# Patient Record
(demographics unavailable — no encounter records)

---

## 2025-01-07 NOTE — HISTORY OF PRESENT ILLNESS
[FreeTextEntry1] : 61 y/o F here initial visit  Pt says she has "arthritis"   Pt says she has b/l knee and foot pain.  Pt also has pain in neck going down on RUE.  Reports joint stiffness.  Pt says she gets R foot swelling.  Pt says her pain is worse in pm.  Pt says PT helps patient.  Pt has tried gel injections which helps her knees.  Pt has tried tylenol, voltaren gel which helps patient.   No fevers, h/a, rashes, hair loss, oral ulcers, epistaxis, sinusitis,  swollen glands, dry mouth, dry eyes, CP, SOB, cough, vision changes, abdominal pain, GERD, n/v/d, blood in stool or urine, focal weakness, sensory loss,  Raynaud's, joint pain, swelling, weight loss.

## 2025-01-07 NOTE — PHYSICAL EXAM
[General Appearance - Well Nourished] : well nourished [General Appearance - Well Developed] : well developed [FreeTextEntry1] : obese [Sclera] : the sclera and conjunctiva were normal [Auscultation Breath Sounds / Voice Sounds] : lungs were clear to auscultation bilaterally [Heart Rate And Rhythm] : heart rate was normal and rhythm regular [Heart Sounds] : normal S1 and S2 [Murmurs] : no murmurs [Abdomen Soft] : soft [Abdomen Tenderness] : non-tender [Cervical Lymph Nodes Enlarged Posterior Bilaterally] : posterior cervical [Cervical Lymph Nodes Enlarged Anterior Bilaterally] : anterior cervical [Supraclavicular Lymph Nodes Enlarged Bilaterally] : supraclavicular [Musculoskeletal - Swelling] : no joint swelling seen [] : no rash [Skin Lesions] : no skin lesions [Oriented To Time, Place, And Person] : oriented to person, place, and time

## 2025-01-07 NOTE — ASSESSMENT
[FreeTextEntry1] : 63 y/o F w polyarthralgias =pain in L shoulders, b/l knees, R foot =stiffness, R foot swelling =worse in pm =obese  Will send serologies. Will obtain images. ddx RA vs SLE vs OA.   Plan -Labs w serologies, inflammatory markers -Xrays shoulders, neck -obtain [facility] R foot xray  RTO depending on above results

## 2025-06-03 NOTE — REASON FOR VISIT
[Follow-Up Visit] : a follow-up visit for [Knee Pain] : knee pain [FreeTextEntry2] : bilateral knee monovisc gel injection

## 2025-06-03 NOTE — HISTORY OF PRESENT ILLNESS
[de-identified] : Patient returns today discussed further conservative measures to be undertaken help reduce bilateral knee pain due to advanced knee osteoarthritis.  Patient is keen to avoid knee replacement surgery due to her young age.  She is also here for single-dose bilateral HA injections.

## 2025-06-03 NOTE — DISCUSSION/SUMMARY
[de-identified] : Patient I had a prolonged discussion on further conservative measures to be undertaken to help reduce her bilateral knee pain due to advanced knee osteoarthritis.  As stated above patient is keen to avoid knee replacement surgery at this point in her life.  We talked at length about liberally icing the knees when symptomatic patient can also take over-the-counter anti-inflammatories as needed for pain in addition to today's Monovisc injections patient also return every 6 months for cortisone injections as temporizing measures if needed.  We also dressed the fact the patient's current calculated BMI of 31.18 kg/mg places her into the diagnostic category of obese.  We talked at length about the need to engage in a weight/BMI reduction protocol to help reduce current symptoms also improve the efficacy of today's injections.  Furthermore in the setting of potential knee replacement surgery patient was told that weight reduction/BMI reduction has a positive effect and potentially reducing perioperative complications and especially since the patient's age of 62 there is a positive effect and potentially prolong implant longevity.  Today's consultation regarding patient's BMI and diagnosis of obese in the setting of potential knee replacement surgery lasted 30 minutes exclusive teaching time and any separately billed procedures.

## 2025-06-03 NOTE — PROCEDURE
[de-identified] : MONOVISC [employer] NDC [address]-57 LOT # [id number] EXP [id number] 4ML/22MG  Patient given bilateral Monovisc injection today was done in sterile conditions and ultrasound guidance of the lateral compartment of each knee.  Patient tolerated injections well.

## 2025-06-03 NOTE — PHYSICAL EXAM
[de-identified] : Patient most recent calculated BMI is 30.18 kg/m..  Right knee exam there is some mild tenderness to the medial joint line minimal warmth minimal soft tissue swelling no effusion range of motion 0 to 130 degrees.  The knee is stable to AP stress varus valgus stress in both full extension and 90 degrees of flexion.  Similar findings in the left knee today mild medial joint line tenderness definite soft tissue swelling and warmth no obvious effusion.  Knee stable to AP stress varus valgus stress in both full extension and 90 degrees of flexion range of motion 0 to 130 degrees.